# Patient Record
Sex: FEMALE | Race: WHITE | NOT HISPANIC OR LATINO | Employment: FULL TIME | URBAN - METROPOLITAN AREA
[De-identification: names, ages, dates, MRNs, and addresses within clinical notes are randomized per-mention and may not be internally consistent; named-entity substitution may affect disease eponyms.]

---

## 2024-10-18 ENCOUNTER — TRANSCRIBE ORDERS (OUTPATIENT)
Dept: LAB | Facility: HOSPITAL | Age: 24
End: 2024-10-18
Payer: COMMERCIAL

## 2024-10-18 ENCOUNTER — LAB (OUTPATIENT)
Dept: LAB | Facility: HOSPITAL | Age: 24
End: 2024-10-18
Payer: COMMERCIAL

## 2024-10-18 DIAGNOSIS — Z34.81 PRENATAL CARE, SUBSEQUENT PREGNANCY, FIRST TRIMESTER: Primary | ICD-10-CM

## 2024-10-18 LAB
ABO GROUP BLD: NORMAL
AMPHET+METHAMPHET UR QL: NEGATIVE
AMPHETAMINES UR QL: NEGATIVE
BACTERIA UR QL AUTO: ABNORMAL /HPF
BARBITURATES UR QL SCN: NEGATIVE
BENZODIAZ UR QL SCN: NEGATIVE
BILIRUB UR QL STRIP: NEGATIVE
BILIRUB UR QL STRIP: NEGATIVE
BLD GP AB SCN SERPL QL: NEGATIVE
BUPRENORPHINE SERPL-MCNC: NEGATIVE NG/ML
CANNABINOIDS SERPL QL: NEGATIVE
CLARITY UR: ABNORMAL
CLARITY UR: ABNORMAL
COCAINE UR QL: NEGATIVE
COD CRY URNS QL: PRESENT /HPF
COLOR UR: ABNORMAL
COLOR UR: ABNORMAL
DEPRECATED RDW RBC AUTO: 41.9 FL (ref 37–54)
ERYTHROCYTE [DISTWIDTH] IN BLOOD BY AUTOMATED COUNT: 12.9 % (ref 12.3–15.4)
FENTANYL UR-MCNC: NEGATIVE NG/ML
GLUCOSE UR STRIP-MCNC: NEGATIVE MG/DL
GLUCOSE UR STRIP-MCNC: NEGATIVE MG/DL
HBV SURFACE AG SERPL QL IA: NORMAL
HCT VFR BLD AUTO: 41 % (ref 34–46.6)
HCV AB SER QL: NORMAL
HGB BLD-MCNC: 13.8 G/DL (ref 12–15.9)
HGB UR QL STRIP.AUTO: ABNORMAL
HGB UR QL STRIP.AUTO: ABNORMAL
HIV 1+2 AB+HIV1 P24 AG SERPL QL IA: NORMAL
HOLD SPECIMEN: NORMAL
HYALINE CASTS UR QL AUTO: ABNORMAL /LPF
KETONES UR QL STRIP: ABNORMAL
KETONES UR QL STRIP: ABNORMAL
LEUKOCYTE ESTERASE UR QL STRIP.AUTO: ABNORMAL
LEUKOCYTE ESTERASE UR QL STRIP.AUTO: ABNORMAL
MCH RBC QN AUTO: 30.5 PG (ref 26.6–33)
MCHC RBC AUTO-ENTMCNC: 33.7 G/DL (ref 31.5–35.7)
MCV RBC AUTO: 90.5 FL (ref 79–97)
METHADONE UR QL SCN: NEGATIVE
NITRITE UR QL STRIP: NEGATIVE
NITRITE UR QL STRIP: NEGATIVE
OPIATES UR QL: NEGATIVE
OXYCODONE UR QL SCN: NEGATIVE
PCP UR QL SCN: NEGATIVE
PH UR STRIP.AUTO: 6 [PH] (ref 5–8)
PH UR STRIP.AUTO: 6 [PH] (ref 5–8)
PLATELET # BLD AUTO: 239 10*3/MM3 (ref 140–450)
PMV BLD AUTO: 10.7 FL (ref 6–12)
PROT UR QL STRIP: ABNORMAL
PROT UR QL STRIP: ABNORMAL
RBC # BLD AUTO: 4.53 10*6/MM3 (ref 3.77–5.28)
RBC # UR STRIP: ABNORMAL /HPF
REF LAB TEST METHOD: ABNORMAL
RH BLD: POSITIVE
SP GR UR STRIP: 1.02 (ref 1–1.03)
SP GR UR STRIP: 1.02 (ref 1–1.03)
SQUAMOUS #/AREA URNS HPF: ABNORMAL /HPF
TREPONEMA PALLIDUM IGG+IGM AB [PRESENCE] IN SERUM OR PLASMA BY IMMUNOASSAY: NORMAL
TRICYCLICS UR QL SCN: NEGATIVE
UROBILINOGEN UR QL STRIP: ABNORMAL
UROBILINOGEN UR QL STRIP: ABNORMAL
WBC # UR STRIP: ABNORMAL /HPF
WBC NRBC COR # BLD AUTO: 10.86 10*3/MM3 (ref 3.4–10.8)

## 2024-10-18 PROCEDURE — 86850 RBC ANTIBODY SCREEN: CPT | Performed by: OBSTETRICS & GYNECOLOGY

## 2024-10-18 PROCEDURE — 81001 URINALYSIS AUTO W/SCOPE: CPT | Performed by: OBSTETRICS & GYNECOLOGY

## 2024-10-18 PROCEDURE — 86780 TREPONEMA PALLIDUM: CPT | Performed by: OBSTETRICS & GYNECOLOGY

## 2024-10-18 PROCEDURE — 36415 COLL VENOUS BLD VENIPUNCTURE: CPT | Performed by: OBSTETRICS & GYNECOLOGY

## 2024-10-18 PROCEDURE — 86803 HEPATITIS C AB TEST: CPT | Performed by: OBSTETRICS & GYNECOLOGY

## 2024-10-18 PROCEDURE — 86762 RUBELLA ANTIBODY: CPT | Performed by: OBSTETRICS & GYNECOLOGY

## 2024-10-18 PROCEDURE — 85027 COMPLETE CBC AUTOMATED: CPT | Performed by: OBSTETRICS & GYNECOLOGY

## 2024-10-18 PROCEDURE — 87086 URINE CULTURE/COLONY COUNT: CPT | Performed by: OBSTETRICS & GYNECOLOGY

## 2024-10-18 PROCEDURE — 87340 HEPATITIS B SURFACE AG IA: CPT | Performed by: OBSTETRICS & GYNECOLOGY

## 2024-10-18 PROCEDURE — 87491 CHLMYD TRACH DNA AMP PROBE: CPT | Performed by: OBSTETRICS & GYNECOLOGY

## 2024-10-18 PROCEDURE — 86901 BLOOD TYPING SEROLOGIC RH(D): CPT | Performed by: OBSTETRICS & GYNECOLOGY

## 2024-10-18 PROCEDURE — G0432 EIA HIV-1/HIV-2 SCREEN: HCPCS | Performed by: OBSTETRICS & GYNECOLOGY

## 2024-10-18 PROCEDURE — 87591 N.GONORRHOEAE DNA AMP PROB: CPT | Performed by: OBSTETRICS & GYNECOLOGY

## 2024-10-18 PROCEDURE — 86787 VARICELLA-ZOSTER ANTIBODY: CPT | Performed by: OBSTETRICS & GYNECOLOGY

## 2024-10-18 PROCEDURE — 80307 DRUG TEST PRSMV CHEM ANLYZR: CPT | Performed by: OBSTETRICS & GYNECOLOGY

## 2024-10-18 PROCEDURE — 86900 BLOOD TYPING SEROLOGIC ABO: CPT | Performed by: OBSTETRICS & GYNECOLOGY

## 2024-10-19 LAB
BACTERIA SPEC AEROBE CULT: NO GROWTH
RUBV IGG SERPL IA-ACNC: 2.04 INDEX
VZV IGG SER QL IA: NON REACTIVE

## 2024-10-22 LAB
C TRACH RRNA SPEC QL NAA+PROBE: NEGATIVE
N GONORRHOEA RRNA SPEC QL NAA+PROBE: NEGATIVE

## 2024-11-15 ENCOUNTER — TRANSCRIBE ORDERS (OUTPATIENT)
Dept: LAB | Facility: HOSPITAL | Age: 24
End: 2024-11-15
Payer: COMMERCIAL

## 2024-11-15 ENCOUNTER — LAB (OUTPATIENT)
Dept: LAB | Facility: HOSPITAL | Age: 24
End: 2024-11-15
Payer: COMMERCIAL

## 2024-11-15 DIAGNOSIS — Z3A.16 16 WEEKS GESTATION OF PREGNANCY: ICD-10-CM

## 2024-11-15 DIAGNOSIS — Z3A.16 16 WEEKS GESTATION OF PREGNANCY: Primary | ICD-10-CM

## 2024-11-15 PROCEDURE — 86336 INHIBIN A: CPT

## 2024-11-15 PROCEDURE — 82677 ASSAY OF ESTRIOL: CPT

## 2024-11-15 PROCEDURE — 84702 CHORIONIC GONADOTROPIN TEST: CPT

## 2024-11-15 PROCEDURE — 36415 COLL VENOUS BLD VENIPUNCTURE: CPT

## 2024-11-15 PROCEDURE — 82105 ALPHA-FETOPROTEIN SERUM: CPT

## 2025-01-10 ENCOUNTER — TRANSCRIBE ORDERS (OUTPATIENT)
Dept: LAB | Facility: HOSPITAL | Age: 25
End: 2025-01-10
Payer: COMMERCIAL

## 2025-01-10 ENCOUNTER — LAB (OUTPATIENT)
Dept: LAB | Facility: HOSPITAL | Age: 25
End: 2025-01-10
Payer: COMMERCIAL

## 2025-01-10 DIAGNOSIS — Z34.92 SECOND TRIMESTER PREGNANCY: Primary | ICD-10-CM

## 2025-01-10 DIAGNOSIS — Z34.92 SECOND TRIMESTER PREGNANCY: ICD-10-CM

## 2025-01-10 LAB
BLD GP AB SCN SERPL QL: NEGATIVE
GLUCOSE 1H P 100 G GLC PO SERPL-MCNC: 179 MG/DL (ref 65–139)

## 2025-01-10 PROCEDURE — 36415 COLL VENOUS BLD VENIPUNCTURE: CPT

## 2025-01-10 PROCEDURE — 85025 COMPLETE CBC W/AUTO DIFF WBC: CPT

## 2025-01-10 PROCEDURE — 82948 REAGENT STRIP/BLOOD GLUCOSE: CPT | Performed by: ADVANCED PRACTICE MIDWIFE

## 2025-01-10 PROCEDURE — 86850 RBC ANTIBODY SCREEN: CPT

## 2025-01-10 PROCEDURE — 82950 GLUCOSE TEST: CPT

## 2025-01-10 PROCEDURE — 86780 TREPONEMA PALLIDUM: CPT

## 2025-01-11 LAB
BASOPHILS # BLD AUTO: 0.03 10*3/MM3 (ref 0–0.2)
BASOPHILS NFR BLD AUTO: 0.2 % (ref 0–1.5)
DEPRECATED RDW RBC AUTO: 39.2 FL (ref 37–54)
EOSINOPHIL # BLD AUTO: 0.27 10*3/MM3 (ref 0–0.4)
EOSINOPHIL NFR BLD AUTO: 1.9 % (ref 0.3–6.2)
ERYTHROCYTE [DISTWIDTH] IN BLOOD BY AUTOMATED COUNT: 11.9 % (ref 12.3–15.4)
HCT VFR BLD AUTO: 36 % (ref 34–46.6)
HGB BLD-MCNC: 12.9 G/DL (ref 12–15.9)
IMM GRANULOCYTES # BLD AUTO: 0.08 10*3/MM3 (ref 0–0.05)
IMM GRANULOCYTES NFR BLD AUTO: 0.6 % (ref 0–0.5)
LYMPHOCYTES # BLD AUTO: 2.5 10*3/MM3 (ref 0.7–3.1)
LYMPHOCYTES NFR BLD AUTO: 17.7 % (ref 19.6–45.3)
MCH RBC QN AUTO: 32.7 PG (ref 26.6–33)
MCHC RBC AUTO-ENTMCNC: 35.8 G/DL (ref 31.5–35.7)
MCV RBC AUTO: 91.4 FL (ref 79–97)
MONOCYTES # BLD AUTO: 0.57 10*3/MM3 (ref 0.1–0.9)
MONOCYTES NFR BLD AUTO: 4 % (ref 5–12)
NEUTROPHILS NFR BLD AUTO: 10.66 10*3/MM3 (ref 1.7–7)
NEUTROPHILS NFR BLD AUTO: 75.6 % (ref 42.7–76)
NRBC BLD AUTO-RTO: 0 /100 WBC (ref 0–0.2)
PLATELET # BLD AUTO: 269 10*3/MM3 (ref 140–450)
PMV BLD AUTO: 10 FL (ref 6–12)
RBC # BLD AUTO: 3.94 10*6/MM3 (ref 3.77–5.28)
TREPONEMA PALLIDUM IGG+IGM AB [PRESENCE] IN SERUM OR PLASMA BY IMMUNOASSAY: NORMAL
WBC NRBC COR # BLD AUTO: 14.11 10*3/MM3 (ref 3.4–10.8)

## 2025-01-17 ENCOUNTER — TRANSCRIBE ORDERS (OUTPATIENT)
Dept: LAB | Facility: HOSPITAL | Age: 25
End: 2025-01-17
Payer: COMMERCIAL

## 2025-01-17 ENCOUNTER — LAB (OUTPATIENT)
Dept: LAB | Facility: HOSPITAL | Age: 25
End: 2025-01-17
Payer: COMMERCIAL

## 2025-01-17 DIAGNOSIS — O99.810 ABNORMAL MATERNAL GLUCOSE TOLERANCE, ANTEPARTUM: Primary | ICD-10-CM

## 2025-01-17 DIAGNOSIS — O99.810 ABNORMAL MATERNAL GLUCOSE TOLERANCE, ANTEPARTUM: ICD-10-CM

## 2025-01-17 LAB
GLUCOSE 1H P 100 G GLC PO SERPL-MCNC: 155 MG/DL (ref 74–180)
GLUCOSE 2H P 100 G GLC PO SERPL-MCNC: 137 MG/DL (ref 74–155)
GLUCOSE 3H P 100 G GLC PO SERPL-MCNC: 116 MG/DL (ref 74–140)
GLUCOSE BLDC GLUCOMTR-MCNC: 103 MG/DL
GLUCOSE BLDC GLUCOMTR-MCNC: 85 MG/DL (ref 70–130)
GLUCOSE P FAST SERPL-MCNC: 83 MG/DL (ref 74–106)

## 2025-01-17 PROCEDURE — 82952 GTT-ADDED SAMPLES: CPT

## 2025-01-17 PROCEDURE — 82948 REAGENT STRIP/BLOOD GLUCOSE: CPT | Performed by: ADVANCED PRACTICE MIDWIFE

## 2025-01-17 PROCEDURE — 36415 COLL VENOUS BLD VENIPUNCTURE: CPT

## 2025-01-17 PROCEDURE — 82951 GLUCOSE TOLERANCE TEST (GTT): CPT

## 2025-04-07 ENCOUNTER — HOSPITAL ENCOUNTER (OUTPATIENT)
Facility: HOSPITAL | Age: 25
Discharge: HOME | End: 2025-04-07
Attending: OBSTETRICS & GYNECOLOGY | Admitting: OBSTETRICS & GYNECOLOGY
Payer: COMMERCIAL

## 2025-04-07 VITALS — SYSTOLIC BLOOD PRESSURE: 124 MMHG | TEMPERATURE: 98.3 F | RESPIRATION RATE: 16 BRPM | DIASTOLIC BLOOD PRESSURE: 74 MMHG

## 2025-04-07 PROBLEM — O47.03 FALSE LABOR BEFORE 37 COMPLETED WEEKS OF GESTATION DURING PREGNANCY IN THIRD TRIMESTER, ANTEPARTUM: Status: ACTIVE | Noted: 2025-04-07

## 2025-04-07 PROCEDURE — G0378 HOSPITAL OBSERVATION PER HR: HCPCS

## 2025-04-07 PROCEDURE — 59025 FETAL NON-STRESS TEST: CPT

## 2025-04-07 PROCEDURE — G0463 HOSPITAL OUTPT CLINIC VISIT: HCPCS

## 2025-04-08 NOTE — H&P
Haven Gary  2000  [id number]  [id number]    CC: contractions  HPI:  Patient is 25 y.o. female   currently at 36w3d presents with c/o contractions, onset ~.  Pt denies bleeding or leaking.  Good FM.  PNC comp by prev  delivery and postpartum preeclampsia    PMH:  Current meds: PNV, baby ASA, macrobid 100mg/d  Illnesses: UTI's  Surgeries: ear surg, T and A  Allergies: PCN (childhood allergy, ?reaction)    Past OB History:       OB History    Para Term  AB Living   1 0 0 0 0 0   SAB IAB Ectopic Molar Multiple Live Births   0 0 0 0 0 0      # Outcome Date GA Lbr Fabien/2nd Weight Sex Type Anes PTL Lv   1 Current                     SH: tob neg , EtOH neg, drugs neg      General ROS: contractions.   All other systems reviewed and are negative.      Physical Examination: General appearance - alert, well appearing, and in no distress  Vital signs - /74   Resp 16   HEENT: normocephalic, atraumatic,oropharynx clear, appearance of ears and nose normal  Neck - supple, no significant adenopathy, no thyromegaly  Lymphatics - no palpable lymphadenopathy in the neck or groin, no hepatosplenomegaly  Chest - clear to auscultation, no wheezes, rales or rhonchi, respiratory effort non-labored  Heart - normal rate, regular rhythm, no murmurs, rubs, clicks or gallops, no JVD, trace lower extremity edema  Abdomen - soft, nontender, nondistended, no masses, no hepatosplenomegaly  no rebound tenderness noted, bowel sounds normal  Vaginal Exam: 2/40%/-2, no blood in vault,external genitalia normal  Extremities - trace pedal edema noted, no calf tenderness  Skin -warm and dry, normal coloration and turgor, no rashes, no suspicious skin lesions noted    Fetal monitoring: indication contractions, onset 2238, offset 2310, baseline 130, mod BTB variability, multiple accels (15 X 15), no decels, irreg contractions, interpretation reactive NST    Radiology     Assessment 1)IUP 36 3/7 weeks   2)false  labor   3)hx preeclampsia   4)hx  delivery    Plan 1)observe   2)home   3)keep next sched appt    Bennett Torres MD  2025  23:12 EDT

## 2025-04-29 ENCOUNTER — PREP FOR SURGERY (OUTPATIENT)
Dept: OTHER | Facility: HOSPITAL | Age: 25
End: 2025-04-29
Payer: COMMERCIAL

## 2025-04-29 DIAGNOSIS — Z34.90 TERM PREGNANCY: Primary | ICD-10-CM

## 2025-04-29 RX ORDER — ONDANSETRON 2 MG/ML
4 INJECTION INTRAMUSCULAR; INTRAVENOUS EVERY 6 HOURS PRN
Status: CANCELLED | OUTPATIENT
Start: 2025-04-29

## 2025-04-29 RX ORDER — SODIUM CHLORIDE 0.9 % (FLUSH) 0.9 %
10 SYRINGE (ML) INJECTION AS NEEDED
Status: CANCELLED | OUTPATIENT
Start: 2025-04-29

## 2025-04-29 RX ORDER — ONDANSETRON 4 MG/1
4 TABLET, ORALLY DISINTEGRATING ORAL EVERY 6 HOURS PRN
Status: CANCELLED | OUTPATIENT
Start: 2025-04-29

## 2025-04-29 RX ORDER — PROMETHAZINE HYDROCHLORIDE 12.5 MG/1
12.5 TABLET ORAL EVERY 6 HOURS PRN
Status: CANCELLED | OUTPATIENT
Start: 2025-04-29

## 2025-04-29 RX ORDER — OXYTOCIN/0.9 % SODIUM CHLORIDE 30/500 ML
30 PLASTIC BAG, INJECTION (ML) INTRAVENOUS CONTINUOUS
Status: CANCELLED | OUTPATIENT
Start: 2025-04-29 | End: 2025-04-29

## 2025-04-29 RX ORDER — MORPHINE SULFATE 2 MG/ML
2 INJECTION, SOLUTION INTRAMUSCULAR; INTRAVENOUS
Status: CANCELLED | OUTPATIENT
Start: 2025-04-29 | End: 2025-05-04

## 2025-04-29 RX ORDER — SODIUM CHLORIDE 0.9 % (FLUSH) 0.9 %
10 SYRINGE (ML) INJECTION EVERY 12 HOURS SCHEDULED
Status: CANCELLED | OUTPATIENT
Start: 2025-04-29

## 2025-04-29 RX ORDER — MISOPROSTOL 200 UG/1
800 TABLET ORAL AS NEEDED
Status: CANCELLED | OUTPATIENT
Start: 2025-04-29

## 2025-04-29 RX ORDER — SODIUM CHLORIDE, SODIUM LACTATE, POTASSIUM CHLORIDE, CALCIUM CHLORIDE 600; 310; 30; 20 MG/100ML; MG/100ML; MG/100ML; MG/100ML
75 INJECTION, SOLUTION INTRAVENOUS CONTINUOUS
Status: CANCELLED | OUTPATIENT
Start: 2025-04-29 | End: 2025-05-01

## 2025-04-29 RX ORDER — SODIUM CHLORIDE 9 MG/ML
40 INJECTION, SOLUTION INTRAVENOUS AS NEEDED
Status: CANCELLED | OUTPATIENT
Start: 2025-04-29

## 2025-04-29 RX ORDER — MAGNESIUM CARB/ALUMINUM HYDROX 105-160MG
30 TABLET,CHEWABLE ORAL ONCE
Status: CANCELLED | OUTPATIENT
Start: 2025-04-29 | End: 2025-04-29

## 2025-04-29 RX ORDER — BUTORPHANOL TARTRATE 1 MG/ML
1 INJECTION, SOLUTION INTRAMUSCULAR; INTRAVENOUS
Status: CANCELLED | OUTPATIENT
Start: 2025-04-29

## 2025-04-29 RX ORDER — ACETAMINOPHEN 325 MG/1
650 TABLET ORAL EVERY 4 HOURS PRN
Status: CANCELLED | OUTPATIENT
Start: 2025-04-29

## 2025-04-29 RX ORDER — OXYTOCIN/0.9 % SODIUM CHLORIDE 30/500 ML
30 PLASTIC BAG, INJECTION (ML) INTRAVENOUS ONCE
Status: CANCELLED | OUTPATIENT
Start: 2025-04-29 | End: 2025-04-29

## 2025-04-29 RX ORDER — LIDOCAINE HYDROCHLORIDE 10 MG/ML
0.5 INJECTION, SOLUTION EPIDURAL; INFILTRATION; INTRACAUDAL; PERINEURAL ONCE AS NEEDED
Status: CANCELLED | OUTPATIENT
Start: 2025-04-29

## 2025-04-29 RX ORDER — METHYLERGONOVINE MALEATE 0.2 MG/ML
200 INJECTION INTRAVENOUS ONCE AS NEEDED
Status: CANCELLED | OUTPATIENT
Start: 2025-04-29

## 2025-04-29 RX ORDER — PROMETHAZINE HYDROCHLORIDE 12.5 MG/1
12.5 SUPPOSITORY RECTAL EVERY 6 HOURS PRN
Status: CANCELLED | OUTPATIENT
Start: 2025-04-29

## 2025-04-29 RX ORDER — OXYTOCIN/0.9 % SODIUM CHLORIDE 30/500 ML
2-20 PLASTIC BAG, INJECTION (ML) INTRAVENOUS
Status: CANCELLED | OUTPATIENT
Start: 2025-04-29

## 2025-04-29 RX ORDER — TERBUTALINE SULFATE 1 MG/ML
0.25 INJECTION SUBCUTANEOUS AS NEEDED
Status: CANCELLED | OUTPATIENT
Start: 2025-04-29

## 2025-04-29 RX ORDER — CARBOPROST TROMETHAMINE 250 UG/ML
250 INJECTION, SOLUTION INTRAMUSCULAR AS NEEDED
Status: CANCELLED | OUTPATIENT
Start: 2025-04-29

## 2025-05-05 ENCOUNTER — HOSPITAL ENCOUNTER (INPATIENT)
Facility: HOSPITAL | Age: 25
LOS: 1 days | Discharge: HOME | End: 2025-05-06
Attending: OBSTETRICS & GYNECOLOGY | Admitting: OBSTETRICS & GYNECOLOGY
Payer: COMMERCIAL

## 2025-05-05 ENCOUNTER — ANESTHESIA EVENT (OUTPATIENT)
Dept: LABOR AND DELIVERY | Facility: HOSPITAL | Age: 25
End: 2025-05-05
Payer: COMMERCIAL

## 2025-05-05 ENCOUNTER — ANESTHESIA (OUTPATIENT)
Dept: LABOR AND DELIVERY | Facility: HOSPITAL | Age: 25
End: 2025-05-05
Payer: COMMERCIAL

## 2025-05-05 DIAGNOSIS — Z34.90 TERM PREGNANCY: ICD-10-CM

## 2025-05-05 LAB
ABO GROUP BLD: NORMAL
BLD GP AB SCN SERPL QL: NEGATIVE
DEPRECATED RDW RBC AUTO: 41.5 FL (ref 37–54)
ERYTHROCYTE [DISTWIDTH] IN BLOOD BY AUTOMATED COUNT: 13.5 % (ref 12.3–15.4)
HCT VFR BLD AUTO: 36 % (ref 34–46.6)
HGB BLD-MCNC: 12.2 G/DL (ref 12–15.9)
MCH RBC QN AUTO: 28.8 PG (ref 26.6–33)
MCHC RBC AUTO-ENTMCNC: 33.9 G/DL (ref 31.5–35.7)
MCV RBC AUTO: 85.1 FL (ref 79–97)
PLATELET # BLD AUTO: 316 10*3/MM3 (ref 140–450)
PMV BLD AUTO: 9.8 FL (ref 6–12)
RBC # BLD AUTO: 4.23 10*6/MM3 (ref 3.77–5.28)
RH BLD: POSITIVE
T&S EXPIRATION DATE: NORMAL
TREPONEMA PALLIDUM IGG+IGM AB [PRESENCE] IN SERUM OR PLASMA BY IMMUNOASSAY: NORMAL
WBC NRBC COR # BLD AUTO: 11.12 10*3/MM3 (ref 3.4–10.8)

## 2025-05-05 PROCEDURE — 25810000003 LACTATED RINGERS SOLUTION: Performed by: ANESTHESIOLOGY

## 2025-05-05 PROCEDURE — 25010000002 BUPIVACAINE (PF) 0.25 % SOLUTION: Performed by: ANESTHESIOLOGY

## 2025-05-05 PROCEDURE — 86850 RBC ANTIBODY SCREEN: CPT | Performed by: OBSTETRICS & GYNECOLOGY

## 2025-05-05 PROCEDURE — 25810000003 LACTATED RINGERS PER 1000 ML: Performed by: OBSTETRICS & GYNECOLOGY

## 2025-05-05 PROCEDURE — 86901 BLOOD TYPING SEROLOGIC RH(D): CPT | Performed by: OBSTETRICS & GYNECOLOGY

## 2025-05-05 PROCEDURE — 10907ZC DRAINAGE OF AMNIOTIC FLUID, THERAPEUTIC FROM PRODUCTS OF CONCEPTION, VIA NATURAL OR ARTIFICIAL OPENING: ICD-10-PCS | Performed by: ADVANCED PRACTICE MIDWIFE

## 2025-05-05 PROCEDURE — 25010000002 ROPIVACAINE PER 1 MG: Performed by: ANESTHESIOLOGY

## 2025-05-05 PROCEDURE — C1755 CATHETER, INTRASPINAL: HCPCS | Performed by: ANESTHESIOLOGY

## 2025-05-05 PROCEDURE — 86900 BLOOD TYPING SEROLOGIC ABO: CPT | Performed by: OBSTETRICS & GYNECOLOGY

## 2025-05-05 PROCEDURE — 25010000002 FENTANYL CITRATE (PF) 50 MCG/ML SOLUTION: Performed by: ANESTHESIOLOGY

## 2025-05-05 PROCEDURE — C1755 CATHETER, INTRASPINAL: HCPCS

## 2025-05-05 PROCEDURE — 85027 COMPLETE CBC AUTOMATED: CPT | Performed by: OBSTETRICS & GYNECOLOGY

## 2025-05-05 PROCEDURE — 25810000003 LACTATED RINGERS SOLUTION: Performed by: OBSTETRICS & GYNECOLOGY

## 2025-05-05 PROCEDURE — 25010000002 LIDOCAINE-EPINEPHRINE (PF) 1.5 %-1:200000 SOLUTION: Performed by: ANESTHESIOLOGY

## 2025-05-05 PROCEDURE — 86780 TREPONEMA PALLIDUM: CPT | Performed by: OBSTETRICS & GYNECOLOGY

## 2025-05-05 PROCEDURE — 59025 FETAL NON-STRESS TEST: CPT

## 2025-05-05 PROCEDURE — 51703 INSERT BLADDER CATH COMPLEX: CPT

## 2025-05-05 PROCEDURE — 0UQMXZZ REPAIR VULVA, EXTERNAL APPROACH: ICD-10-PCS | Performed by: OBSTETRICS & GYNECOLOGY

## 2025-05-05 RX ORDER — FAMOTIDINE 10 MG/ML
20 INJECTION, SOLUTION INTRAVENOUS ONCE AS NEEDED
Status: DISCONTINUED | OUTPATIENT
Start: 2025-05-05 | End: 2025-05-05

## 2025-05-05 RX ORDER — PRENATAL WITH FERROUS FUM AND FOLIC ACID 3080; 920; 120; 400; 22; 1.84; 3; 20; 10; 1; 12; 200; 27; 25; 2 [IU]/1; [IU]/1; MG/1; [IU]/1; MG/1; MG/1; MG/1; MG/1; MG/1; MG/1; UG/1; MG/1; MG/1; MG/1; MG/1
1 TABLET ORAL DAILY
COMMUNITY

## 2025-05-05 RX ORDER — EPHEDRINE SULFATE/0.9% NACL/PF 25 MG/5 ML
10 SYRINGE (ML) INTRAVENOUS
Status: DISCONTINUED | OUTPATIENT
Start: 2025-05-05 | End: 2025-05-05

## 2025-05-05 RX ORDER — PROMETHAZINE HYDROCHLORIDE 12.5 MG/1
12.5 SUPPOSITORY RECTAL EVERY 6 HOURS PRN
Status: DISCONTINUED | OUTPATIENT
Start: 2025-05-05 | End: 2025-05-05 | Stop reason: HOSPADM

## 2025-05-05 RX ORDER — CARBOPROST TROMETHAMINE 250 UG/ML
250 INJECTION, SOLUTION INTRAMUSCULAR AS NEEDED
Status: DISCONTINUED | OUTPATIENT
Start: 2025-05-05 | End: 2025-05-05 | Stop reason: HOSPADM

## 2025-05-05 RX ORDER — DIPHENHYDRAMINE HCL 25 MG
25 CAPSULE ORAL NIGHTLY PRN
Status: DISCONTINUED | OUTPATIENT
Start: 2025-05-05 | End: 2025-05-06 | Stop reason: HOSPADM

## 2025-05-05 RX ORDER — HYDROCODONE BITARTRATE AND ACETAMINOPHEN 5; 325 MG/1; MG/1
1 TABLET ORAL EVERY 4 HOURS PRN
Status: DISCONTINUED | OUTPATIENT
Start: 2025-05-05 | End: 2025-05-06 | Stop reason: HOSPADM

## 2025-05-05 RX ORDER — PROMETHAZINE HYDROCHLORIDE 12.5 MG/1
12.5 TABLET ORAL EVERY 4 HOURS PRN
Status: DISCONTINUED | OUTPATIENT
Start: 2025-05-05 | End: 2025-05-06 | Stop reason: HOSPADM

## 2025-05-05 RX ORDER — NITROFURANTOIN 25; 75 MG/1; MG/1
100 CAPSULE ORAL NIGHTLY
COMMUNITY
End: 2025-05-06

## 2025-05-05 RX ORDER — OXYTOCIN/0.9 % SODIUM CHLORIDE 30/500 ML
2-20 PLASTIC BAG, INJECTION (ML) INTRAVENOUS
Status: DISCONTINUED | OUTPATIENT
Start: 2025-05-05 | End: 2025-05-05 | Stop reason: HOSPADM

## 2025-05-05 RX ORDER — MORPHINE SULFATE 2 MG/ML
2 INJECTION, SOLUTION INTRAMUSCULAR; INTRAVENOUS
Status: DISCONTINUED | OUTPATIENT
Start: 2025-05-05 | End: 2025-05-05

## 2025-05-05 RX ORDER — FENTANYL CITRATE 50 UG/ML
INJECTION, SOLUTION INTRAMUSCULAR; INTRAVENOUS AS NEEDED
Status: DISCONTINUED | OUTPATIENT
Start: 2025-05-05 | End: 2025-05-05 | Stop reason: SURG

## 2025-05-05 RX ORDER — ONDANSETRON 2 MG/ML
4 INJECTION INTRAMUSCULAR; INTRAVENOUS EVERY 6 HOURS PRN
Status: DISCONTINUED | OUTPATIENT
Start: 2025-05-05 | End: 2025-05-05 | Stop reason: HOSPADM

## 2025-05-05 RX ORDER — LIDOCAINE HYDROCHLORIDE 10 MG/ML
0.5 INJECTION, SOLUTION EPIDURAL; INFILTRATION; INTRACAUDAL; PERINEURAL ONCE AS NEEDED
Status: DISCONTINUED | OUTPATIENT
Start: 2025-05-05 | End: 2025-05-05 | Stop reason: HOSPADM

## 2025-05-05 RX ORDER — HYDROCORTISONE 25 MG/G
1 CREAM TOPICAL AS NEEDED
Status: DISCONTINUED | OUTPATIENT
Start: 2025-05-05 | End: 2025-05-06 | Stop reason: HOSPADM

## 2025-05-05 RX ORDER — SIMETHICONE 80 MG
80 TABLET,CHEWABLE ORAL 4 TIMES DAILY PRN
Status: DISCONTINUED | OUTPATIENT
Start: 2025-05-05 | End: 2025-05-06 | Stop reason: HOSPADM

## 2025-05-05 RX ORDER — DIPHENHYDRAMINE HYDROCHLORIDE 50 MG/ML
12.5 INJECTION, SOLUTION INTRAMUSCULAR; INTRAVENOUS EVERY 8 HOURS PRN
Status: DISCONTINUED | OUTPATIENT
Start: 2025-05-05 | End: 2025-05-05

## 2025-05-05 RX ORDER — SODIUM CHLORIDE 9 MG/ML
40 INJECTION, SOLUTION INTRAVENOUS AS NEEDED
Status: DISCONTINUED | OUTPATIENT
Start: 2025-05-05 | End: 2025-05-05

## 2025-05-05 RX ORDER — ROPIVACAINE HYDROCHLORIDE 2 MG/ML
15 INJECTION, SOLUTION EPIDURAL; INFILTRATION; PERINEURAL CONTINUOUS
Status: DISCONTINUED | OUTPATIENT
Start: 2025-05-05 | End: 2025-05-05

## 2025-05-05 RX ORDER — SODIUM CHLORIDE 0.9 % (FLUSH) 0.9 %
1-10 SYRINGE (ML) INJECTION AS NEEDED
Status: DISCONTINUED | OUTPATIENT
Start: 2025-05-05 | End: 2025-05-06 | Stop reason: HOSPADM

## 2025-05-05 RX ORDER — SODIUM CHLORIDE 0.9 % (FLUSH) 0.9 %
10 SYRINGE (ML) INJECTION AS NEEDED
Status: DISCONTINUED | OUTPATIENT
Start: 2025-05-05 | End: 2025-05-05

## 2025-05-05 RX ORDER — PROMETHAZINE HYDROCHLORIDE 12.5 MG/1
12.5 TABLET ORAL EVERY 6 HOURS PRN
Status: DISCONTINUED | OUTPATIENT
Start: 2025-05-05 | End: 2025-05-05 | Stop reason: HOSPADM

## 2025-05-05 RX ORDER — ONDANSETRON 4 MG/1
4 TABLET, ORALLY DISINTEGRATING ORAL EVERY 6 HOURS PRN
Status: DISCONTINUED | OUTPATIENT
Start: 2025-05-05 | End: 2025-05-05 | Stop reason: HOSPADM

## 2025-05-05 RX ORDER — OXYTOCIN/0.9 % SODIUM CHLORIDE 30/500 ML
125 PLASTIC BAG, INJECTION (ML) INTRAVENOUS ONCE AS NEEDED
Status: DISCONTINUED | OUTPATIENT
Start: 2025-05-05 | End: 2025-05-06 | Stop reason: HOSPADM

## 2025-05-05 RX ORDER — ACETAMINOPHEN 325 MG/1
650 TABLET ORAL EVERY 6 HOURS PRN
Status: DISCONTINUED | OUTPATIENT
Start: 2025-05-05 | End: 2025-05-06 | Stop reason: HOSPADM

## 2025-05-05 RX ORDER — IBUPROFEN 600 MG/1
600 TABLET, FILM COATED ORAL EVERY 6 HOURS PRN
Status: DISCONTINUED | OUTPATIENT
Start: 2025-05-05 | End: 2025-05-06 | Stop reason: HOSPADM

## 2025-05-05 RX ORDER — METHYLERGONOVINE MALEATE 0.2 MG/ML
200 INJECTION INTRAVENOUS ONCE AS NEEDED
Status: DISCONTINUED | OUTPATIENT
Start: 2025-05-05 | End: 2025-05-06 | Stop reason: HOSPADM

## 2025-05-05 RX ORDER — CALCIUM CARBONATE 500 MG/1
1 TABLET, CHEWABLE ORAL 3 TIMES DAILY PRN
Status: DISCONTINUED | OUTPATIENT
Start: 2025-05-05 | End: 2025-05-06 | Stop reason: HOSPADM

## 2025-05-05 RX ORDER — METHYLERGONOVINE MALEATE 0.2 MG/ML
200 INJECTION INTRAVENOUS ONCE AS NEEDED
Status: DISCONTINUED | OUTPATIENT
Start: 2025-05-05 | End: 2025-05-05 | Stop reason: HOSPADM

## 2025-05-05 RX ORDER — CITRIC ACID/SODIUM CITRATE 334-500MG
30 SOLUTION, ORAL ORAL ONCE
Status: DISCONTINUED | OUTPATIENT
Start: 2025-05-05 | End: 2025-05-05 | Stop reason: HOSPADM

## 2025-05-05 RX ORDER — OXYTOCIN/0.9 % SODIUM CHLORIDE 30/500 ML
30 PLASTIC BAG, INJECTION (ML) INTRAVENOUS ONCE
Status: COMPLETED | OUTPATIENT
Start: 2025-05-05 | End: 2025-05-05

## 2025-05-05 RX ORDER — CARBOPROST TROMETHAMINE 250 UG/ML
250 INJECTION, SOLUTION INTRAMUSCULAR AS NEEDED
Status: DISCONTINUED | OUTPATIENT
Start: 2025-05-05 | End: 2025-05-06 | Stop reason: HOSPADM

## 2025-05-05 RX ORDER — TERBUTALINE SULFATE 1 MG/ML
0.25 INJECTION SUBCUTANEOUS AS NEEDED
Status: DISCONTINUED | OUTPATIENT
Start: 2025-05-05 | End: 2025-05-05

## 2025-05-05 RX ORDER — MISOPROSTOL 200 UG/1
800 TABLET ORAL AS NEEDED
Status: DISCONTINUED | OUTPATIENT
Start: 2025-05-05 | End: 2025-05-05 | Stop reason: HOSPADM

## 2025-05-05 RX ORDER — ACETAMINOPHEN 325 MG/1
650 TABLET ORAL EVERY 4 HOURS PRN
Status: DISCONTINUED | OUTPATIENT
Start: 2025-05-05 | End: 2025-05-05 | Stop reason: HOSPADM

## 2025-05-05 RX ORDER — ONDANSETRON 4 MG/1
4 TABLET, ORALLY DISINTEGRATING ORAL EVERY 8 HOURS PRN
Status: DISCONTINUED | OUTPATIENT
Start: 2025-05-05 | End: 2025-05-06 | Stop reason: HOSPADM

## 2025-05-05 RX ORDER — BUPIVACAINE HYDROCHLORIDE 2.5 MG/ML
INJECTION, SOLUTION EPIDURAL; INFILTRATION; INTRACAUDAL; PERINEURAL AS NEEDED
Status: DISCONTINUED | OUTPATIENT
Start: 2025-05-05 | End: 2025-05-05 | Stop reason: SURG

## 2025-05-05 RX ORDER — DOCUSATE SODIUM 100 MG/1
100 CAPSULE, LIQUID FILLED ORAL 2 TIMES DAILY
Status: DISCONTINUED | OUTPATIENT
Start: 2025-05-05 | End: 2025-05-06 | Stop reason: HOSPADM

## 2025-05-05 RX ORDER — OXYTOCIN/0.9 % SODIUM CHLORIDE 30/500 ML
30 PLASTIC BAG, INJECTION (ML) INTRAVENOUS CONTINUOUS
Status: ACTIVE | OUTPATIENT
Start: 2025-05-05 | End: 2025-05-05

## 2025-05-05 RX ORDER — MISOPROSTOL 200 UG/1
800 TABLET ORAL AS NEEDED
Status: DISCONTINUED | OUTPATIENT
Start: 2025-05-05 | End: 2025-05-06 | Stop reason: HOSPADM

## 2025-05-05 RX ORDER — ASPIRIN 81 MG/1
81 TABLET ORAL DAILY
COMMUNITY
End: 2025-05-06

## 2025-05-05 RX ORDER — LIDOCAINE HYDROCHLORIDE AND EPINEPHRINE 15; 5 MG/ML; UG/ML
INJECTION, SOLUTION EPIDURAL AS NEEDED
Status: DISCONTINUED | OUTPATIENT
Start: 2025-05-05 | End: 2025-05-05 | Stop reason: SURG

## 2025-05-05 RX ORDER — SODIUM CHLORIDE 0.9 % (FLUSH) 0.9 %
10 SYRINGE (ML) INJECTION EVERY 12 HOURS SCHEDULED
Status: DISCONTINUED | OUTPATIENT
Start: 2025-05-05 | End: 2025-05-05

## 2025-05-05 RX ORDER — OXYTOCIN/0.9 % SODIUM CHLORIDE 30/500 ML
PLASTIC BAG, INJECTION (ML) INTRAVENOUS
Status: DISCONTINUED
Start: 2025-05-05 | End: 2025-05-06 | Stop reason: HOSPADM

## 2025-05-05 RX ORDER — SODIUM CHLORIDE, SODIUM LACTATE, POTASSIUM CHLORIDE, CALCIUM CHLORIDE 600; 310; 30; 20 MG/100ML; MG/100ML; MG/100ML; MG/100ML
75 INJECTION, SOLUTION INTRAVENOUS CONTINUOUS
Status: DISCONTINUED | OUTPATIENT
Start: 2025-05-05 | End: 2025-05-05

## 2025-05-05 RX ORDER — ONDANSETRON 2 MG/ML
4 INJECTION INTRAMUSCULAR; INTRAVENOUS EVERY 6 HOURS PRN
Status: DISCONTINUED | OUTPATIENT
Start: 2025-05-05 | End: 2025-05-06 | Stop reason: HOSPADM

## 2025-05-05 RX ORDER — PRENATAL VIT/IRON FUM/FOLIC AC 27MG-0.8MG
1 TABLET ORAL DAILY
Status: DISCONTINUED | OUTPATIENT
Start: 2025-05-05 | End: 2025-05-06 | Stop reason: HOSPADM

## 2025-05-05 RX ORDER — MAGNESIUM CARB/ALUMINUM HYDROX 105-160MG
30 TABLET,CHEWABLE ORAL ONCE
Status: DISCONTINUED | OUTPATIENT
Start: 2025-05-05 | End: 2025-05-05

## 2025-05-05 RX ORDER — HYDROCODONE BITARTRATE AND ACETAMINOPHEN 10; 325 MG/1; MG/1
1 TABLET ORAL EVERY 4 HOURS PRN
Status: DISCONTINUED | OUTPATIENT
Start: 2025-05-05 | End: 2025-05-06 | Stop reason: HOSPADM

## 2025-05-05 RX ORDER — BUTORPHANOL TARTRATE 1 MG/ML
1 INJECTION, SOLUTION INTRAMUSCULAR; INTRAVENOUS
Status: DISCONTINUED | OUTPATIENT
Start: 2025-05-05 | End: 2025-05-05

## 2025-05-05 RX ADMIN — EPHEDRINE SULFATE 10 MG: 5 INJECTION INTRAVENOUS at 03:22

## 2025-05-05 RX ADMIN — LIDOCAINE HYDROCHLORIDE AND EPINEPHRINE 2 ML: 15; 5 INJECTION, SOLUTION EPIDURAL at 02:01

## 2025-05-05 RX ADMIN — LIDOCAINE HYDROCHLORIDE AND EPINEPHRINE 3 ML: 15; 5 INJECTION, SOLUTION EPIDURAL at 02:00

## 2025-05-05 RX ADMIN — Medication 1 APPLICATION: at 11:45

## 2025-05-05 RX ADMIN — IBUPROFEN 600 MG: 600 TABLET, FILM COATED ORAL at 20:38

## 2025-05-05 RX ADMIN — FENTANYL CITRATE 100 MCG: 50 INJECTION, SOLUTION INTRAMUSCULAR; INTRAVENOUS at 02:02

## 2025-05-05 RX ADMIN — SODIUM CHLORIDE, POTASSIUM CHLORIDE, SODIUM LACTATE AND CALCIUM CHLORIDE 1000 ML: 600; 310; 30; 20 INJECTION, SOLUTION INTRAVENOUS at 01:55

## 2025-05-05 RX ADMIN — DOCUSATE SODIUM 100 MG: 100 CAPSULE, LIQUID FILLED ORAL at 11:45

## 2025-05-05 RX ADMIN — PRENATAL VITAMINS-IRON FUMARATE 27 MG IRON-FOLIC ACID 0.8 MG TABLET 1 TABLET: at 11:45

## 2025-05-05 RX ADMIN — Medication: at 11:45

## 2025-05-05 RX ADMIN — SODIUM CHLORIDE, POTASSIUM CHLORIDE, SODIUM LACTATE AND CALCIUM CHLORIDE 75 ML/HR: 600; 310; 30; 20 INJECTION, SOLUTION INTRAVENOUS at 03:32

## 2025-05-05 RX ADMIN — BUPIVACAINE HYDROCHLORIDE 8 ML: 2.5 INJECTION, SOLUTION EPIDURAL; INFILTRATION; INTRACAUDAL; PERINEURAL at 02:02

## 2025-05-05 RX ADMIN — ROPIVACAINE HYDROCHLORIDE 14 ML/HR: 2 INJECTION, SOLUTION EPIDURAL; INFILTRATION at 02:07

## 2025-05-05 RX ADMIN — DOCUSATE SODIUM 100 MG: 100 CAPSULE, LIQUID FILLED ORAL at 20:38

## 2025-05-05 RX ADMIN — SODIUM CHLORIDE, POTASSIUM CHLORIDE, SODIUM LACTATE AND CALCIUM CHLORIDE 1000 ML: 600; 310; 30; 20 INJECTION, SOLUTION INTRAVENOUS at 01:15

## 2025-05-05 RX ADMIN — WITCH HAZEL: 500 SOLUTION RECTAL; TOPICAL at 11:44

## 2025-05-05 RX ADMIN — EPHEDRINE SULFATE 10 MG: 5 INJECTION INTRAVENOUS at 06:31

## 2025-05-05 RX ADMIN — Medication 30 UNITS: at 08:20

## 2025-05-05 NOTE — ANESTHESIA PREPROCEDURE EVALUATION
Anesthesia Evaluation     Patient summary reviewed and Nursing notes reviewed                Airway   Mallampati: II  TM distance: >3 FB  Neck ROM: full  No difficulty expected  Dental      Pulmonary - negative pulmonary ROS   Cardiovascular - negative cardio ROS        Neuro/Psych- negative ROS  GI/Hepatic/Renal/Endo - negative ROS     Musculoskeletal (-) negative ROS    Abdominal    Substance History - negative use     OB/GYN    (+) Pregnant        Other                    Anesthesia Plan    ASA 2     epidural       Anesthetic plan, risks, benefits, and alternatives have been provided, discussed and informed consent has been obtained with: patient.    CODE STATUS:    Code Status (Patient has no pulse and is not breathing): CPR (Attempt to Resuscitate)  Medical Interventions (Patient has pulse or is breathing): Full Support  Level Of Support Discussed With: Patient

## 2025-05-05 NOTE — PROGRESS NOTES
Preet Anna Ranay [place]  : 2000  MRN: [id number]  CSN: [id number]    Labor progress note    Subjective   She is feeling rectal pressure.     Objective   Min/max vitals past 24 hours:  Temp  Min: 97.8 °F (36.6 °C)  Max: 98.2 °F (36.8 °C)   BP  Min: 78/43  Max: 141/77   Pulse  Min: 70  Max: 121   Resp  Min: 16  Max: 16        FHT's: reassuring, variable decelerations are present, early decelerations are present, and category 2   Cervix: was checked (by me): 10 cm / 100 % / +1   Contractions: regular every 2 minutes - external monitors used        Assessment   IUP at 40w3d  Progressing in labor  Fetal status reassuring     Plan   Allow to labor down. Patient pushed with several contractions. Little movement noted. Given option to labor down for 30-60 minutes    Peggy Oneil CNM  2025  07:00 EDT

## 2025-05-05 NOTE — LACTATION NOTE
05/05/25 1440   Maternal Information   Date of Referral 05/05/25   Person Making Referral lactation consultant  (Courtesy visit for new delivery. Pt reports she exclusively pumped for 8mos & made good milk. Pt states she would like to BF & do some pumping because she will be going back to work.)   Maternal Assessment   Left Nipple Symptoms intact;nontender   Right Nipple Symptoms intact;nontender  (per patient report)   Maternal Infant Feeding   Maternal Emotional State independent;receptive;relaxed   Latch Assistance   (I offered to help waken infant to get her to breast but pt states she would like to eat first. Pt encouraged to call out for latch check when ready. Pt allowed me to review BF teaching sheet.)   Milk Expression/Equipment   Breast Pump Type double electric, personal  (Pt given a Spectra pump for home use from [employer]. Enc'd pt to pump for consistently short or missed feedings or if supplement is given.)

## 2025-05-05 NOTE — PROGRESS NOTES
SHANNON Anna Ranay [place]  : 2000  MRN: [id number]  CSN: [id number]    Labor progress note    Subjective   She is comfortable with the epidural.     Objective   Min/max vitals past 24 hours:  Temp  Min: 98.2 °F (36.8 °C)  Max: 98.2 °F (36.8 °C)   BP  Min: 109/57  Max: 134/65   Pulse  Min: 81  Max: 103   Resp  Min: 16  Max: 16        FHT's: reassuring, variable decelerations are present, and category 2   Cervix: was checked (by me): 8 cm / 100 % / -2   Contractions: regular every 3-6 minutes - external monitors used        Assessment   IUP at 40w3d  Progressing in labor  Fetal status reassuring     Plan   AROM - clear fluid seen     Peggy Oneil CNM  2025  04:39 EDT

## 2025-05-05 NOTE — PROGRESS NOTES
SHANNON Anna Ranay [place]  : 2000  MRN: [id number]  CSN: [id number]    Labor progress note    Subjective   She is feeling pelvic pressure.     Objective   Min/max vitals past 24 hours:  Temp  Min: 98.2 °F (36.8 °C)  Max: 98.2 °F (36.8 °C)   BP  Min: 78/43  Max: 141/77   Pulse  Min: 70  Max: 121   Resp  Min: 16  Max: 16        FHT's: reassuring and category 1   Cervix: was checked (by me): 9-10 cm / 100 % / 0   Contractions: regular every 3 minutes - external monitors used        Assessment   IUP at 40w3d  Progressing in labor  Fetal status reassuring     Plan   Recheck cervix in 1 hours    Peggy Oneil CNM  2025  06:07 EDT

## 2025-05-05 NOTE — L&D DELIVERY NOTE
" [facility]   Vaginal Delivery Note    Patient Name: Shoshana Ferguson  : 2000  MRN: [id number]    Date of Delivery: 2025    Diagnosis     Pre & Post-Delivery:  Intrauterine pregnancy at 40w3d  Labor status:      Term pregnancy             Problem List    Transfer to Postpartum     Review the Delivery Report for details.     Delivery     Delivery:   Spontaneous Vaginal    YOB: 2025   Time of Birth:  Gestational Age 8:17 AM  40w3d     Anesthesia:   Epidural    Delivering clinician:  Noris Funez MD   Forceps?   No   Vacuum? No    Shoulder dystocia present: No        Delivery narrative:  The patient progressed to complete and delivered a VFI  with Apgars 8/9 the weight is  7#9 via  with epidural anesthesia. Shoulders were delivered easily. The cord was clamped and cut after 60 second delay and the infant was placed on the mother's chest for skin- to - skin. Cord blood was obtained and the placenta was delivered spontaneously intact. 30 units of IV Pitocin was started. Uterine tone was appropriate.   Right labial laceration(s) noted  and repaired with 3-0 Vicyl.   The patient tolerated the procedure well and remained in the LDR for recovery. All counts correct.        Infant     Findings: female infant     Infant observations: Weight: 3435 g (7 lb 9.2 oz)  Length: 20 in  Observations/Comments:        Apgars:  8 @ 1 minute /     9 @ 5 minutes   Infant Name:      Placenta & Cord         Placenta delivered   Spontaneously at        Cord: 3 vessels present.   Nuchal Cord?  no   Cord blood obtained:     Cord gases obtained:      Cord gas results: Venous:  No results found for: \"PHCVEN\", \"BECVEN\"    Arterial:  No results found for: \"PHCART\", \"BECART\"     Repair     Episiotomy: Not recorded    No    Lacerations: Yes  Laceration Information  Laceration Repaired?   Perineal:       Periurethral:       Labial:  rt yes   Sulcus:       Vaginal:       Cervical:         Suture used for repair: " 3-0 Vicryl  Laceration Length for 3rd or 4th degree lacerations:  cm   Estimated Blood Loss:       Quantitative Blood Loss:  100mL      TOTAL BLOOD LOSS : 0 mL (5/5/2025 12:46 AM - 5/5/2025  8:29 AM)  Complications     none    Disposition     Mother to Mother Baby/Postpartum  in stable condition currently.  Baby to NBN  in stable condition currently.    Noris Funez MD  05/05/25  08:29 EDT

## 2025-05-05 NOTE — H&P
"[address] [place] [address]  Obstetric History and Physical    Chief Complaint   Patient presents with    Contractions       Subjective     Patient is a 25 y.o. female  currently at 40w3d, who presents for term labor  without ROM. She is comfortable with epidural.    Her prenatal care is benign. She failed 1-hour GTT but passed 3-hour. Her previous obstetric/gynecological history is remarkable for  x1 of infant with gastroschisis. She had PP preeclampsia.    The following portions of the patients history were reviewed and updated as appropriate: current medications, allergies, past medical history, past surgical history, past family history, past social history, and problem list .       Prenatal Information:   Maternal Prenatal Labs  Blood Type ABO Type   Date Value Ref Range Status   2025 B  Final      Rh Status RH type   Date Value Ref Range Status   2025 Positive  Final      Antibody Screen Antibody Screen   Date Value Ref Range Status   2025 Negative  Final      Gonnorhea No results found for: \"GCCX\"   Chlamydia No results found for: \"CLAMYDCU\"   RPR No results found for: \"RPR\"   Syphilis Antibody No results found for: \"SYPHILIS\"   Rubella No results found for: \"RUBELLAIGGIN\"   Hepatitis B Surface Antigen No results found for: \"HEPBSAG\"   HIV-1 Antibody No results found for: \"LABHIV1\"   Hepatitis C Antibody No results found for: \"HEPCAB\"   Rapid Urin Drug Screen No results found for: \"AMPMETHU\", \"BARBITSCNUR\", \"LABBENZSCN\", \"LABMETHSCN\", \"LABOPIASCN\", \"THCURSCR\", \"COCAINEUR\", \"AMPHETSCREEN\", \"PROPOXSCN\", \"BUPRENORSCNU\", \"METAMPSCNUR\", \"OXYCODONESCN\", \"TRICYCLICSCN\"   Group B Strep Culture Negative                 Past OB History:       OB History    Para Term  AB Living   2 1 1 0 0 1   SAB IAB Ectopic Molar Multiple Live Births   0 0 0 0 0 1      # Outcome Date GA Lbr Fabien/2nd Weight Sex Type Anes PTL Lv   2 Current            1 Term 23 37w0d  3204 g (7 lb 1 oz) F Vag-Vacuum EPI " "N KERRIE       Past Medical History: Past Medical History:   Diagnosis Date    PCOS (polycystic ovarian syndrome)     Preeclampsia     with 1st pregnancy      Past Surgical History Past Surgical History:   Procedure Laterality Date    INNER EAR SURGERY Bilateral     \"about 11 surgeries on ears to repair holes in the ear drums\"      Family History: History reviewed. No pertinent family history.   Social History:  reports that she has never smoked. She has been exposed to tobacco smoke. She has never used smokeless tobacco.   reports that she does not currently use alcohol.   reports no history of drug use.        REVIEW OF SYSTEMS              Reports fetal movement is normal             Denies leakage of amniotic fluid.             Denies vaginal bleeding             She reports Regular contractions, frequency: Every 5-10 minutes, intensity moderate             All other systems reviewed and are negative    Objective       Vital Signs Range for the last 24 hours  Temperature: Temp:  [98.2 °F (36.8 °C)] 98.2 °F (36.8 °C)   Temp Source: Temp src: Oral   BP: BP: (109-134)/(56-81) 134/65   Pulse: Heart Rate:  [] 81   Respirations: Resp:  [16] 16   SPO2: SpO2:  [98 %] 98 %   O2 Amount (l/min):     O2 Devices     Weight: Weight:  [81.6 kg (180 lb)] 81.6 kg (180 lb)       Presentation: vertex   Cervix: Exam by:  WILBUR   Dilation:  5   Effacement: Cervical Effacement: 70   Station:  -2       Fetal Heart Rate Assessment   Method: Fetal HR Assessment Method: external   Beats/min: Fetal HR (beats/min): 120   Baseline: Fetal HR Baseline: normal range   Varibility: Fetal HR Variability: moderate (amplitude range 6 to 25 bpm)   Accels: Fetal HR Accelerations: greater than/equal to 15 bpm, lasting at least 15 seconds   Decels: Fetal HR Decelerations: absent   Tracing Category:  1    NST: REACTIVE     Uterine Assessment   Method: Method: external tocotransducer   Frequency (min): Contraction Frequency (Minutes): 3-6   Ctx Count in " 10 min:     Duration:     Intensity:     Intensity by IUPC:     Resting Tone:     Resting Tone by IUPC:     [place] Units:             Constitutional:  Well developed, well nourished, no acute distress, well-groomed.   Respiratory:  Lungs are clear to auscultation bilaterally, normal breath sounds.   Cardiovascular:  Normal rate and rhythm, no murmurs.   Gastrointestinal:  Soft, gravid, nontender.  Uterus: Soft, nontender. Fundus appropriate for dates.  Neurologic:  Alert & oriented x 3,  no focal deficits noted.   Psychiatric:  Speech and behavior appropriate.   Extremities: no cyanosis, clubbing or edema, no evidence of DVT.        Labs:  Lab Results   Component Value Date    WBC 11.12 (H) 2025    HGB 12.2 2025    HCT 36.0 2025    MCV 85.1 2025     2025    POCGLU 103 2025     Results from last 7 days   Lab Units 25  0121   ABO TYPING  B   RH TYPING  Positive   ANTIBODY SCREEN  Negative           Assessment & Plan       Term pregnancy        Assessment:  1.  Intrauterine pregnancy at 40w3d weeks gestation with reactive fetal status.    2.   term labor  without ROM  3.  Obstetrical history is remarkable for  x1 with infant with gastroschisis and PP preeclampsia.   4.  GBS status: Negative    Plan:  1.Expectant management  2. Plan of care has been reviewed with patient and questions answered.  3.  Risks, benefits of treatment plan have been discussed.  4.  All questions have been answered.        Peggy Oneil CNM  2025  03:56 EDT

## 2025-05-05 NOTE — ANESTHESIA PROCEDURE NOTES
Labor Epidural      Patient reassessed immediately prior to procedure    Patient location during procedure: OB  Performed By  Anesthesiologist: Nehemias Robles DO  Preanesthetic Checklist  Completed: patient identified, IV checked, site marked, risks and benefits discussed, surgical consent, monitors and equipment checked, pre-op evaluation and timeout performed  Prep:  Pt Position:sitting  Sterile Tech:gloves, mask, sterile barrier and cap  Prep:chlorhexidine gluconate and isopropyl alcohol  Monitoring:blood pressure monitoring and continuous pulse oximetry  Epidural Block Procedure:  Approach:midline  Guidance:landmark technique and palpation technique  Location:L2-L3  Needle Type:Tuohy  Needle Gauge:17 G  Loss of Resistance Medium: air  Loss of Resistance: 5cm  Cath Depth at skin:11 cm  Paresthesia: none  Aspiration:negative  Test Dose:negative  Number of Attempts: 1  Post Assessment:  Dressing:secured with tape and occlusive dressing applied (Tegaderm Placed)  Pt Tolerance:patient tolerated the procedure well with no apparent complications  Complications:no

## 2025-05-06 VITALS
SYSTOLIC BLOOD PRESSURE: 113 MMHG | BODY MASS INDEX: 31.89 KG/M2 | OXYGEN SATURATION: 100 % | RESPIRATION RATE: 16 BRPM | TEMPERATURE: 98.2 F | HEIGHT: 63 IN | HEART RATE: 73 BPM | WEIGHT: 180 LBS | DIASTOLIC BLOOD PRESSURE: 71 MMHG

## 2025-05-06 PROBLEM — O47.03 FALSE LABOR BEFORE 37 COMPLETED WEEKS OF GESTATION DURING PREGNANCY IN THIRD TRIMESTER, ANTEPARTUM: Status: RESOLVED | Noted: 2025-04-07 | Resolved: 2025-05-06

## 2025-05-06 PROBLEM — Z34.90 TERM PREGNANCY: Status: RESOLVED | Noted: 2025-05-05 | Resolved: 2025-05-06

## 2025-05-06 LAB
BASOPHILS # BLD AUTO: 0.06 10*3/MM3 (ref 0–0.2)
BASOPHILS NFR BLD AUTO: 0.5 % (ref 0–1.5)
DEPRECATED RDW RBC AUTO: 44.6 FL (ref 37–54)
EOSINOPHIL # BLD AUTO: 0.26 10*3/MM3 (ref 0–0.4)
EOSINOPHIL NFR BLD AUTO: 2 % (ref 0.3–6.2)
ERYTHROCYTE [DISTWIDTH] IN BLOOD BY AUTOMATED COUNT: 13.8 % (ref 12.3–15.4)
HCT VFR BLD AUTO: 32.5 % (ref 34–46.6)
HGB BLD-MCNC: 10.6 G/DL (ref 12–15.9)
IMM GRANULOCYTES # BLD AUTO: 0.06 10*3/MM3 (ref 0–0.05)
IMM GRANULOCYTES NFR BLD AUTO: 0.5 % (ref 0–0.5)
LYMPHOCYTES # BLD AUTO: 3.93 10*3/MM3 (ref 0.7–3.1)
LYMPHOCYTES NFR BLD AUTO: 30.8 % (ref 19.6–45.3)
MCH RBC QN AUTO: 29 PG (ref 26.6–33)
MCHC RBC AUTO-ENTMCNC: 32.6 G/DL (ref 31.5–35.7)
MCV RBC AUTO: 88.8 FL (ref 79–97)
MONOCYTES # BLD AUTO: 0.73 10*3/MM3 (ref 0.1–0.9)
MONOCYTES NFR BLD AUTO: 5.7 % (ref 5–12)
NEUTROPHILS NFR BLD AUTO: 60.5 % (ref 42.7–76)
NEUTROPHILS NFR BLD AUTO: 7.71 10*3/MM3 (ref 1.7–7)
NRBC BLD AUTO-RTO: 0 /100 WBC (ref 0–0.2)
PLATELET # BLD AUTO: 258 10*3/MM3 (ref 140–450)
PMV BLD AUTO: 9.9 FL (ref 6–12)
RBC # BLD AUTO: 3.66 10*6/MM3 (ref 3.77–5.28)
WBC NRBC COR # BLD AUTO: 12.75 10*3/MM3 (ref 3.4–10.8)

## 2025-05-06 PROCEDURE — 85025 COMPLETE CBC W/AUTO DIFF WBC: CPT | Performed by: OBSTETRICS & GYNECOLOGY

## 2025-05-06 RX ORDER — PSEUDOEPHEDRINE HCL 30 MG
100 TABLET ORAL 2 TIMES DAILY PRN
Qty: 60 CAPSULE | Refills: 1 | Status: SHIPPED | OUTPATIENT
Start: 2025-05-06

## 2025-05-06 RX ORDER — IBUPROFEN 600 MG/1
600 TABLET, FILM COATED ORAL EVERY 6 HOURS PRN
Qty: 60 TABLET | Refills: 1 | Status: SHIPPED | OUTPATIENT
Start: 2025-05-06

## 2025-05-06 RX ORDER — FERROUS SULFATE 325(65) MG
325 TABLET ORAL
Status: DISCONTINUED | OUTPATIENT
Start: 2025-05-06 | End: 2025-05-06 | Stop reason: HOSPADM

## 2025-05-06 RX ORDER — FERROUS SULFATE 325(65) MG
325 TABLET ORAL
Qty: 30 TABLET | Refills: 1 | Status: SHIPPED | OUTPATIENT
Start: 2025-05-07

## 2025-05-06 RX ADMIN — DOCUSATE SODIUM 100 MG: 100 CAPSULE, LIQUID FILLED ORAL at 08:49

## 2025-05-06 RX ADMIN — PRENATAL VITAMINS-IRON FUMARATE 27 MG IRON-FOLIC ACID 0.8 MG TABLET 1 TABLET: at 08:49

## 2025-05-06 RX ADMIN — FERROUS SULFATE TAB 325 MG (65 MG ELEMENTAL FE) 325 MG: 325 (65 FE) TAB at 08:49

## 2025-05-06 RX ADMIN — IBUPROFEN 600 MG: 600 TABLET, FILM COATED ORAL at 08:49

## 2025-05-06 NOTE — DISCHARGE SUMMARY
[facility]  Vaginal Delivery Discharge Summary      Patient: Shoshana Ferguson      MR#:[id number]  Admission  Diagnosis: Labor  Discharge Diagnosis:     Date of Admission: 2025  Date of Discharge:  2025    Procedures:  Vaginal, Spontaneous    2025   8:17 AM     Service:  Obstetrics    Hospital Course:  Patient underwent vaginal delivery and remained in the hospital for 1 days.  During that time she remained afebrile and hemodynamically stable.  On the day of discharge, she was eating, ambulating and voiding without difficulty.      Lab Results   Component Value Date    WBC 12.75 (H) 2025    HGB 10.6 (L) 2025    HCT 32.5 (L) 2025    MCV 88.8 2025     2025    POCGLU 103 2025     Results from last 7 days   Lab Units 25  0121   ABO TYPING  B   RH TYPING  Positive   ANTIBODY SCREEN  Negative       Discharge Medications     Discharge Medications        New Medications        Instructions Start Date   docusate sodium 100 MG capsule   100 mg, Oral, 2 Times Daily PRN      ferrous sulfate 325 (65 FE) MG tablet   325 mg, Oral, Daily With Breakfast   Start Date: May 7, 2025     ibuprofen 600 MG tablet  Commonly known as: ADVIL,MOTRIN   600 mg, Oral, Every 6 Hours PRN             Continue These Medications        Instructions Start Date   Prenatal 27-1 27-1 MG tablet tablet   1 tablet, Daily             Stop These Medications      aspirin 81 MG EC tablet     nitrofurantoin (macrocrystal-monohydrate) 100 MG capsule  Commonly known as: MACROBID              Discharge Disposition:  To Home    Discharge Condition:  Stable    Discharge Diet: regular    Activity at Discharge: Pelvic rest    Follow-up Appointments  6 weeks      LEIGH Thompson  25  09:37 EDT

## 2025-05-06 NOTE — ANESTHESIA POSTPROCEDURE EVALUATION
Patient: Shoshana Ferguson    Procedure Summary       Date: 05/05/25 Room / Location:     Anesthesia Start: 0149 Anesthesia Stop: 0820    Procedure: LABOR ANALGESIA Diagnosis:     Scheduled Providers:  Provider: Nehemias Robles DO    Anesthesia Type: epidural ASA Status: 2            Anesthesia Type: epidural    Vitals  Vitals Value Taken Time   /71 05/06/25 08:09   Temp 98.2 °F (36.8 °C) 05/06/25 08:09   Pulse 73 05/06/25 08:09   Resp 16 05/06/25 08:09   SpO2 100 % 05/05/25 04:36   Vitals shown include unfiled device data.        Post Anesthesia Care and Evaluation    Patient location during evaluation: bedside  Patient participation: complete - patient participated  Level of consciousness: awake  Pain score: 0  Pain management: satisfactory to patient    Airway patency: patent  Anesthetic complications: No anesthetic complications  PONV Status: none  Cardiovascular status: acceptable and hemodynamically stable  Respiratory status: acceptable  Hydration status: acceptable  Post Neuraxial Block status: Motor and sensory function returned to baseline and No signs or symptoms of PDPH

## 2025-05-06 NOTE — PROGRESS NOTES
5/6/2025  PPD #1    Subjective   Haven feels well.  Patient describes her lochia as less than menses.  Pain is well controlled  She is breastfeeding       Objective   Temp: Temp:  [98.2 °F (36.8 °C)-98.7 °F (37.1 °C)] 98.2 °F (36.8 °C) Temp src: Oral   BP: BP: (113-137)/(58-79) 113/71        Pulse: Heart Rate:  [] 73  RR: Resp:  [16-18] 16    General:  No acute distress   Abdomen: Fundus firm and beneath umbilicus   Pelvis: deferred     Lab Results   Component Value Date    WBC 12.75 (H) 05/06/2025    HGB 10.6 (L) 05/06/2025    HCT 32.5 (L) 05/06/2025    MCV 88.8 05/06/2025     05/06/2025    HEPBSAG Non-Reactive 10/18/2024       Assessment  PPD# 1 after vaginal delivery  Anemia     Plan  Supportive care, anticipate discharge in am.  PO iron      This note has been electronically signed.    Thuy Melgar, APRN  09:17 EDT  May 6, 2025

## 2025-05-06 NOTE — LACTATION NOTE
05/06/25 0906   Maternal Information   Date of Referral 05/06/25   Person Making Referral patient   Maternal Reason for Referral latch difficulty  (latch difficulty on the L side.)   Maternal Assessment   Breast Size Issue other (see comments)  (L breast is significantly larger than right. All other anatomy appears to be normal.)   Breast Shape Bilateral:;round   Breast Density Bilateral:;soft   Nipples Bilateral:;everted   Left Nipple Symptoms intact;nontender   Right Nipple Symptoms intact;nontender   Maternal Infant Feeding   Maternal Emotional State receptive;relaxed   Infant Positioning   (started off in LFB however infant having trouble maintaining latch d/t nasal congestion. Repositioned to [facility] & infant did a bit better but was still fussy from stuffy nose. I will ask PCN to use nasal saline.)   Signs of Milk Transfer   (A couple deep jaw excursions noted with stimulation. Pt has a good supply of colostrum which she could easily hand express.)   Pain with Feeding no   Comfort Measures Before/During Feeding suction broken using finger;maternal position adjusted;latch adjusted;infant position adjusted   Latch Assistance minimal assistance  (Pt able to take over feeding after demonstration given.)   Milk Expression/Equipment   Breast Pump Type   (Enc'd pt to pump breasts for consistenly short or missed feedings or if supplement is given.)   Lactation Referrals   Lactation Referrals   (Pt encouraged to call outpatient lactation services if she needs help after discharge.)

## 2025-05-07 ENCOUNTER — MATERNAL SCREENING (OUTPATIENT)
Dept: CALL CENTER | Facility: HOSPITAL | Age: 25
End: 2025-05-07
Payer: COMMERCIAL

## 2025-05-07 NOTE — OUTREACH NOTE
Maternal Screening Survey      Flowsheet Row Responses   Eligibility Eligible   Prep survey completed? Yes   Facility patient discharged from? [facility] - Registered Nurse

## 2025-05-15 ENCOUNTER — MATERNAL SCREENING (OUTPATIENT)
Dept: CALL CENTER | Facility: HOSPITAL | Age: 25
End: 2025-05-15
Payer: COMMERCIAL

## 2025-05-15 NOTE — OUTREACH NOTE
Maternal Screening Survey      Flowsheet Row Responses   Facility patient discharged from? [facility]   Attempt successful? Yes   Call start time 1011   Call end time 1012   I have been able to laugh and see the funny side of things. 0   I have looked forward with enjoyment to things. 0   I have blamed myself unnecessarily when things went wrong. 0   I have been anxious or worried for no good reason. 0   I have felt scared or panicky for no good reason. 0   Things have been getting on top of me. 0   I have been so unhappy that I have had difficulty sleeping. 0   I have felt sad or miserable. 0   I have been so unhappy that I have been crying. 0   The thought of harming myself has occurred to me. 0   [place]  Depression Scale Total 0   Did any of your parents have problems with alcohol or drug use? No   Do any of your peers have problems with alcohol or drug use? No   Does your partner have problems with alcohol or drug use? No   Before you were pregnant did you have problems with alcohol or drug use? (past) No   In the past month, did you drink beer, wine, liquor or use any other drugs? (pregnancy) No   Maternal Screening call completed Yes   Call end time 1012              [facility]ST[facility] - Registered Nurse